# Patient Record
Sex: FEMALE | Race: WHITE | Employment: FULL TIME | URBAN - METROPOLITAN AREA
[De-identification: names, ages, dates, MRNs, and addresses within clinical notes are randomized per-mention and may not be internally consistent; named-entity substitution may affect disease eponyms.]

---

## 2017-11-10 ENCOUNTER — HOSPITAL ENCOUNTER (EMERGENCY)
Age: 57
Discharge: HOME | End: 2017-11-10
Attending: EMERGENCY MEDICINE
Payer: COMMERCIAL

## 2017-11-10 ENCOUNTER — APPOINTMENT (OUTPATIENT)
Dept: GENERAL RADIOLOGY | Age: 57
End: 2017-11-10
Payer: COMMERCIAL

## 2017-11-10 VITALS
TEMPERATURE: 98.5 F | RESPIRATION RATE: 16 BRPM | WEIGHT: 125 LBS | SYSTOLIC BLOOD PRESSURE: 134 MMHG | HEIGHT: 65 IN | OXYGEN SATURATION: 100 % | BODY MASS INDEX: 20.83 KG/M2 | DIASTOLIC BLOOD PRESSURE: 61 MMHG | HEART RATE: 75 BPM

## 2017-11-10 DIAGNOSIS — S99.911A INJURY OF RIGHT ANKLE, INITIAL ENCOUNTER: Primary | ICD-10-CM

## 2017-11-10 PROCEDURE — 29515 APPLICATION SHORT LEG SPLINT: CPT

## 2017-11-10 PROCEDURE — 99283 EMERGENCY DEPT VISIT LOW MDM: CPT

## 2017-11-10 PROCEDURE — 73630 X-RAY EXAM OF FOOT: CPT

## 2017-11-10 PROCEDURE — 73610 X-RAY EXAM OF ANKLE: CPT

## 2017-11-10 ASSESSMENT — ENCOUNTER SYMPTOMS
TROUBLE SWALLOWING: 0
NAUSEA: 0
VOMITING: 0
SHORTNESS OF BREATH: 0
COUGH: 0

## 2017-11-10 ASSESSMENT — PAIN DESCRIPTION - LOCATION: LOCATION: ANKLE

## 2017-11-10 ASSESSMENT — PAIN DESCRIPTION - PAIN TYPE: TYPE: ACUTE PAIN

## 2017-11-10 ASSESSMENT — PAIN SCALES - GENERAL: PAINLEVEL_OUTOF10: 5

## 2017-11-10 ASSESSMENT — PAIN DESCRIPTION - ORIENTATION: ORIENTATION: RIGHT

## 2017-11-10 NOTE — ED PROVIDER NOTES
[address]  eMERGENCY dEPARTMENT eNCOUnter   Independent Attestation     Pt Name: Mirna Zhou  MRN: [id number]  [place] 1960  Date of evaluation: 11/10/17       Mirna Zhou is a 59 y.o. female who presents with No chief complaint on file. Vitals:   Vitals:    11/10/17 1254   BP: 134/61   Pulse: 75   Resp: 16   Temp: 98.5 °F (36.9 °C)   SpO2: 100%   Weight: 125 lb (56.7 kg)   Height: 5' 5\" (1.651 m)       Impression: No diagnosis found. I was personally available for consultation in the Emergency Department. I have reviewed the chart and agree with the documentation as recorded by the [facility] AND [facility], including the assessment, treatment plan and disposition.     Xavier Anna DO  Attending Emergency  Physician                  Xavier Anna DO  11/10/17 [id number]

## 2017-11-10 NOTE — ED PROVIDER NOTES
[address]  eMERGENCY dEPARTMENT eNCOUnter      Pt Name: Marion Renee  MRN: [id number]  [place] 1960  Date of evaluation: 11/10/2017  Provider: Gregory Dillon [id number]       Chief Complaint   Patient presents with    Ankle Pain         HISTORY OF PRESENT ILLNESS  (Location/Symptom, Timing/Onset, Context/Setting, Quality, Duration, Modifying Factors, Severity.)   Marion Renee is a 59 y.o. female who presents to the emergency department for evaluation of  orthopedic pain:    Location/Symptom:  right ankle  Timing/Onset:  today  Context/Setting:  Patient presents to ED with complaints of right ankle injury. States she tripped and rolled her right ankle. Reports pain in right lateral ankle and foot. States she is able to ambulate but it causes discomfort. No numbness or tingling. No significant swelling. No other injuries. Quality:   Achy, sharp  Duration:   constant  Modifying Factors: Worse with movement, better with rest  Severity:   Mild-moderate      Nursing Notes were reviewed and I agree. REVIEW OF SYSTEMS    (2-9 systems for level 4, 10 or more for level 5)     Review of Systems   Constitutional: Negative for chills and fever. HENT: Negative for trouble swallowing. Respiratory: Negative for cough and shortness of breath. Cardiovascular: Negative for chest pain and palpitations. Gastrointestinal: Negative for nausea and vomiting. Musculoskeletal:        Right ankle injury     Except as noted above the remainder of the review of systems was reviewed and negative.        PAST MEDICAL HISTORY         Diagnosis Date    Abdominal pain     from gall bladder    Bronchiectasis (HCC)     Bunion of left foot     Chest pain     Cholelithiasis     Conjunctivitis of both eyes     Contact dermatitis due to poison ivy     Cough     Cough     Earache symptoms     Eustachian tube disorder     GERD (gastroesophageal reflux disease)     Hearing loss     distress. HENT:   Head: Normocephalic and atraumatic. Right Ear: External ear normal.   Left Ear: External ear normal.   Nose: Nose normal.   Eyes: Right eye exhibits no discharge. Left eye exhibits no discharge. No scleral icterus. Neck: Normal range of motion. No tracheal deviation present. Pulmonary/Chest: Effort normal. No stridor. No respiratory distress. Musculoskeletal: Normal range of motion. She exhibits no edema. Right ankle: She exhibits no swelling, no ecchymosis and no deformity. Tenderness. Lateral malleolus tenderness found. No medial malleolus, no head of 5th metatarsal and no proximal fibula tenderness found. Achilles tendon normal.   Tenderness to palpation of right lateral ankle/ foot. no significant swelling. No ecchymosis. No erythema. Normal dorsiflexion and plantar flexion. DP and PT palpable +2. Sensation intact. Cap refill less than 2 seconds. Able to bear weight but painful. Neurological: She is alert and oriented to person, place, and time. Coordination normal.   Skin: Skin is warm and dry. She is not diaphoretic. Psychiatric: She has a normal mood and affect. Her behavior is normal.       DIAGNOSTIC RESULTS     RADIOLOGY:   [] Visualized by me  And No att. providers found   Xr Ankle Right (min 3 Views)    Result Date: 11/10/2017  EXAMINATION: 3 VIEWS OF THE RIGHT ANKLE; 3 VIEWS OF THE RIGHT FOOT 11/10/2017 1:05 pm COMPARISON: None. HISTORY: ORDERING SYSTEM PROVIDED HISTORY: injury TECHNOLOGIST PROVIDED HISTORY: Reason for exam:->injury Ordering Physician Provided Reason for Exam: PT CO lateral ankle and foot pain after falling earlier this afternoon. Acuity: Acute Type of Exam: Initial FINDINGS: Right ankle: Three views of the right ankle demonstrate normal mineralization and alignment without dislocation or localized soft tissue swelling. .  Ankle mortise is uniform. Talar dome and talar walls are intact.   Small calcification is noted along the dorsum of the talus with adjacent haziness, nonacute in appearance, likely sequelae of nonacute injury. Area is not associated with significant soft tissue swelling. Right foot:  Mineralization and alignment are satisfactory. The patient has moderate arthritic changes in the interphalangeal joints of the 3rd digit. Mild degenerative changes involve some of the interphalangeal joints and the tarsal metatarsal junction. On the dorsum of the talus, nonacute appearing calcification is noted likely rib related to sequelae of old injury. Right ankle:  Irregularity on the dorsum of the distal talus is noted, without significant associated soft tissue swelling, likely nonacute. Ankle is otherwise unremarkable. Right foot:  Small calcification along the dorsum of the talus without significant adjacent soft tissue swelling, likely nonacute. Arthritic changes are present. RECOMMENDATION: Clinical correlation is advised with attention to the dorsum of the talus. Xr Foot Right (min 3 Views)    Result Date: 11/10/2017  EXAMINATION: 3 VIEWS OF THE RIGHT ANKLE; 3 VIEWS OF THE RIGHT FOOT 11/10/2017 1:05 pm COMPARISON: None. HISTORY: ORDERING SYSTEM PROVIDED HISTORY: injury TECHNOLOGIST PROVIDED HISTORY: Reason for exam:->injury Ordering Physician Provided Reason for Exam: PT CO lateral ankle and foot pain after falling earlier this afternoon. Acuity: Acute Type of Exam: Initial FINDINGS: Right ankle: Three views of the right ankle demonstrate normal mineralization and alignment without dislocation or localized soft tissue swelling. .  Ankle mortise is uniform. Talar dome and talar walls are intact. Small calcification is noted along the dorsum of the talus with adjacent haziness, nonacute in appearance, likely sequelae of nonacute injury. Area is not associated with significant soft tissue swelling. Right foot:  Mineralization and alignment are satisfactory.   The patient has moderate arthritic changes in the interphalangeal

## 2017-11-27 ENCOUNTER — HOSPITAL ENCOUNTER (OUTPATIENT)
Age: 57
Discharge: HOME | End: 2017-11-27
Payer: COMMERCIAL

## 2017-11-27 ENCOUNTER — HOSPITAL ENCOUNTER (OUTPATIENT)
Dept: ULTRASOUND IMAGING | Age: 57
Discharge: HOME | End: 2017-11-27
Payer: COMMERCIAL

## 2017-11-27 DIAGNOSIS — E04.2 AUTOSOMAL DOMINANT MULTINODULAR GOITER ASSOCIATED WITH MUTATION IN DICER1 GENE: ICD-10-CM

## 2017-11-27 DIAGNOSIS — E04.2 NONTOXIC MULTINODULAR GOITER: ICD-10-CM

## 2017-11-27 LAB
THYROXINE, FREE: 1.5 NG/DL (ref 0.93–1.7)
TSH SERPL DL<=0.05 MIU/L-ACNC: 1.27 MIU/L (ref 0.3–5)

## 2017-11-27 PROCEDURE — 84439 ASSAY OF FREE THYROXINE: CPT

## 2017-11-27 PROCEDURE — 76536 US EXAM OF HEAD AND NECK: CPT

## 2017-11-27 PROCEDURE — 36415 COLL VENOUS BLD VENIPUNCTURE: CPT

## 2017-11-27 PROCEDURE — 84443 ASSAY THYROID STIM HORMONE: CPT

## 2018-06-11 ENCOUNTER — HOSPITAL ENCOUNTER (OUTPATIENT)
Dept: GENERAL RADIOLOGY | Age: 58
Discharge: HOME | End: 2018-06-13
Payer: COMMERCIAL

## 2018-06-11 ENCOUNTER — HOSPITAL ENCOUNTER (OUTPATIENT)
Age: 58
Discharge: HOME | End: 2018-06-11
Payer: COMMERCIAL

## 2018-06-11 ENCOUNTER — HOSPITAL ENCOUNTER (OUTPATIENT)
Age: 58
Discharge: HOME | End: 2018-06-13
Payer: COMMERCIAL

## 2018-06-11 DIAGNOSIS — R52 PAIN: ICD-10-CM

## 2018-06-11 DIAGNOSIS — R06.02 SOB (SHORTNESS OF BREATH): ICD-10-CM

## 2018-06-11 LAB
EKG ATRIAL RATE: 72 BPM
EKG P AXIS: 78 DEGREES
EKG P-R INTERVAL: 150 MS
EKG Q-T INTERVAL: 400 MS
EKG QRS DURATION: 92 MS
EKG QTC CALCULATION (BAZETT): 438 MS
EKG R AXIS: 71 DEGREES
EKG T AXIS: 66 DEGREES
EKG VENTRICULAR RATE: 72 BPM

## 2018-06-11 PROCEDURE — 73030 X-RAY EXAM OF SHOULDER: CPT

## 2018-06-11 PROCEDURE — 93005 ELECTROCARDIOGRAM TRACING: CPT

## 2018-06-11 PROCEDURE — 71046 X-RAY EXAM CHEST 2 VIEWS: CPT

## 2018-08-09 ENCOUNTER — APPOINTMENT (OUTPATIENT)
Dept: GENERAL RADIOLOGY | Age: 58
End: 2018-08-09
Payer: COMMERCIAL

## 2018-08-09 ENCOUNTER — HOSPITAL ENCOUNTER (EMERGENCY)
Age: 58
Discharge: HOME | End: 2018-08-09
Attending: EMERGENCY MEDICINE
Payer: COMMERCIAL

## 2018-08-09 VITALS
SYSTOLIC BLOOD PRESSURE: 146 MMHG | HEART RATE: 69 BPM | BODY MASS INDEX: 20.83 KG/M2 | HEIGHT: 65 IN | TEMPERATURE: 98.5 F | OXYGEN SATURATION: 100 % | RESPIRATION RATE: 15 BRPM | WEIGHT: 125 LBS | DIASTOLIC BLOOD PRESSURE: 85 MMHG

## 2018-08-09 DIAGNOSIS — S82.892A CLOSED FRACTURE OF LEFT ANKLE, INITIAL ENCOUNTER: Primary | ICD-10-CM

## 2018-08-09 PROCEDURE — 73610 X-RAY EXAM OF ANKLE: CPT

## 2018-08-09 PROCEDURE — 29515 APPLICATION SHORT LEG SPLINT: CPT

## 2018-08-09 PROCEDURE — 99283 EMERGENCY DEPT VISIT LOW MDM: CPT

## 2018-08-09 PROCEDURE — 6370000000 HC RX 637 (ALT 250 FOR IP): Performed by: PHYSICIAN ASSISTANT

## 2018-08-09 RX ORDER — IBUPROFEN 600 MG/1
600 TABLET ORAL ONCE
Status: COMPLETED | OUTPATIENT
Start: 2018-08-09 | End: 2018-08-09

## 2018-08-09 RX ADMIN — IBUPROFEN 600 MG: 600 TABLET ORAL at 09:06

## 2018-08-09 ASSESSMENT — PAIN DESCRIPTION - FREQUENCY: FREQUENCY: CONTINUOUS

## 2018-08-09 ASSESSMENT — PAIN DESCRIPTION - ORIENTATION: ORIENTATION: LEFT

## 2018-08-09 ASSESSMENT — PAIN DESCRIPTION - DESCRIPTORS: DESCRIPTORS: ACHING

## 2018-08-09 ASSESSMENT — PAIN SCALES - GENERAL
PAINLEVEL_OUTOF10: 8
PAINLEVEL_OUTOF10: 5

## 2018-08-09 ASSESSMENT — PAIN DESCRIPTION - LOCATION: LOCATION: ANKLE

## 2018-08-09 ASSESSMENT — PAIN DESCRIPTION - ONSET: ONSET: SUDDEN

## 2018-08-09 ASSESSMENT — PAIN DESCRIPTION - PAIN TYPE: TYPE: ACUTE PAIN

## 2018-08-09 NOTE — ED PROVIDER NOTES
[address]  eMERGENCY dEPARTMENT eNCOUnter   Independent Attestation     Pt Name: Joesph Holt  MRN: [id number]  [place] 1960  Date of evaluation: 8/9/18       Joesph Holt is a 59 y.o. female who presents with Ankle Pain (left)        I was personally available for consultation in the Emergency Department. Dorita Zaidi MD, [address], [facility]  Attending Emergency Physician                    Danica Jackson MD  08/09/18 [id number]

## 2018-08-09 NOTE — ED PROVIDER NOTES
[address]  eMERGENCY dEPARTMENT eNCOUnter      Pt Name: Asia Pagan  MRN: [id number]  [place] 1960  Date of evaluation: 8/9/18      CHIEF COMPLAINT       Chief Complaint   Patient presents with    Ankle Pain     left         HISTORY OF PRESENT ILLNESS    Asia Pagan is a 59 y.o. female who presents complaining of left ankle pain  The history is provided by the patient. Ankle Problem   Location:  Ankle  Time since incident:  1 hour  Injury: yes    Mechanism of injury comment:  Stepped down and twisted left ankle, approx 30 min pta, felt pop  Ankle location:  L ankle  Pain details:     Severity:  Moderate    Onset quality:  Sudden  Chronicity:  New  Dislocation: no    Foreign body present:  No foreign bodies  Tetanus status:  Unknown  Prior injury to area:  Unable to specify  Associated symptoms: swelling        REVIEW OF SYSTEMS       Review of Systems   Musculoskeletal: Positive for arthralgias (left lateral ankle pain). All other systems reviewed and are negative.       PAST MEDICAL HISTORY     Past Medical History:   Diagnosis Date    Abdominal pain     from gall bladder    Bronchiectasis (HCC)     Bunion of left foot     Chest pain     Cholelithiasis     Conjunctivitis of both eyes     Contact dermatitis due to poison ivy     Cough     Cough     Earache symptoms     Eustachian tube disorder     GERD (gastroesophageal reflux disease)     Hearing loss     Hypothyroidism     Malaise     Osteoarthritis     Otitis media     Pain in shoulder     Palpitations     Pharyngitis     Premature ventricular contractions     Pulmonary embolism (HCC)     Raynaud's phenomenon     Rhinitis     Shoulder arthritis     Shoulder pain     Soft tissue injury of foot     Sore throat     Sprain of ankle     Tendinitis     Tinea corporis     URI (upper respiratory infection)     Varicose veins     Vertigo     Wears glasses     Xerosis cutis        SURGICAL HISTORY       Past Surgical History:   Procedure Laterality Date    CHOLECYSTECTOMY  06/21/2016    robotic assisted laparoscopic    COLONOSCOPY      CYST REMOVAL      left wrist and left upper arm    ENDOMETRIAL ABLATION      TONSILLECTOMY      TUBAL LIGATION         CURRENT MEDICATIONS       Discharge Medication List as of 8/9/2018 10:04 AM      CONTINUE these medications which have NOT CHANGED    Details   levothyroxine (LEVOTHROID) 50 MCG tablet Take 1 tablet by mouth daily, Disp-30 tablet, R-3      calcium carbonate (OSCAL) 500 MG TABS tablet Take 500 mg by mouth daily             ALLERGIES     has No Known Allergies. FAMILY HISTORY     indicated that her mother is alive. She indicated that her father is alive. SOCIAL HISTORY      reports that she has never smoked. She has never used smokeless tobacco. She reports that she drinks alcohol. She reports that she does not use drugs. PHYSICAL EXAM     INITIAL VITALS: BP (!) 146/85   Pulse 69   Temp 98.5 °F (36.9 °C) (Oral)   Resp 15   Ht 5' 5\" (1.651 m)   Wt 125 lb (56.7 kg)   SpO2 100%   BMI 20.80 kg/m²      Physical Exam   Constitutional: She is oriented to person, place, and time. She appears well-developed and well-nourished. HENT:   Head: Normocephalic and atraumatic. Cardiovascular: Normal rate, regular rhythm, normal heart sounds and intact distal pulses. Pulmonary/Chest: Effort normal and breath sounds normal.   Musculoskeletal: She exhibits tenderness (left lateral ankle, no deformity, periph pulse palp.tender over the lateral malleolus). She exhibits no edema or deformity. Neurological: She is alert and oriented to person, place, and time. Skin: Skin is warm and dry. Nursing note and vitals reviewed. MEDICAL DECISION MAKING:     Left lateral ankle pain tenderness over the lateral malleolus status post stepping awkwardly off a step. X-ray shows avulsion tip fracture of the lateral malleolus per radiology.   Placed in posterior [facility] splint use crutches ice elevate Tylenol Motrin for discomfort. Follow-up with your orthopedic doctor for recheck and further evaluation and treatment. Return for any worsening symptoms any other concerns. DIAGNOSTIC RESULTS     EKG: All EKG's are interpreted by the Emergency Department Physician who either signs or Co-signs this chart in the absence of a cardiologist.        RADIOLOGY:All plain film, CT, MRI, and formal ultrasound images (except ED bedside ultrasound) are read by the radiologist and the images and interpretations are directly viewed by the emergency physician. Avulsion tip fracture of the lateral malleolus.  Correlate with point   tenderness exam.       Avulsion fracture distal fibula per radiology. LABS: All lab results were reviewed by myself, and all abnormals are listed below. Labs Reviewed - No data to display      EMERGENCY DEPARTMENT COURSE:   Vitals:    Vitals:    08/09/18 0852   BP: (!) 146/85   Pulse: 69   Resp: 15   Temp: 98.5 °F (36.9 °C)   TempSrc: Oral   SpO2: 100%   Weight: 125 lb (56.7 kg)   Height: 5' 5\" (1.651 m)       The patient was given the following medications while in the emergency department:  Orders Placed This Encounter   Medications    ibuprofen (ADVIL;MOTRIN) tablet 600 mg       -------------------------      CRITICAL CARE:     CONSULTS:  None    PROCEDURES:  Procedures    FINAL IMPRESSION      1. Closed fracture of left ankle, initial encounter          DISPOSITION/PLAN   DISPOSITION Decision To Discharge 08/09/2018 10:03:39 AM      PATIENT REFERRED TO:  Aries Suresh MD  [address]   [place]  [facility] [address]    Schedule an appointment as soon as possible for a visit in 1 day      [facility] ED  [facility] [address] [facility] [address]  [phone]    If symptoms worsen      DISCHARGE MEDICATIONS:  Discharge Medication List as of 8/9/2018 10:04 AM          (Please note that portions of this note were completed with a voice

## 2019-05-10 ENCOUNTER — HOSPITAL ENCOUNTER (OUTPATIENT)
Dept: SLEEP CENTER | Age: 59
Discharge: HOME | End: 2019-05-12
Payer: COMMERCIAL

## 2019-05-10 DIAGNOSIS — G47.33 OSA (OBSTRUCTIVE SLEEP APNEA): Primary | ICD-10-CM

## 2019-05-10 PROCEDURE — 95810 POLYSOM 6/> YRS 4/> PARAM: CPT

## 2019-05-11 VITALS
WEIGHT: 132 LBS | HEART RATE: 67 BPM | HEIGHT: 64 IN | BODY MASS INDEX: 22.53 KG/M2 | OXYGEN SATURATION: 96 % | RESPIRATION RATE: 12 BRPM

## 2019-05-11 ASSESSMENT — SLEEP AND FATIGUE QUESTIONNAIRES
HOW LIKELY ARE YOU TO NOD OFF OR FALL ASLEEP WHILE SITTING INACTIVE IN A PUBLIC PLACE: 0
HOW LIKELY ARE YOU TO NOD OFF OR FALL ASLEEP WHILE WATCHING TV: 1
ESS TOTAL SCORE: 6
HOW LIKELY ARE YOU TO NOD OFF OR FALL ASLEEP WHILE SITTING AND TALKING TO SOMEONE: 0
HOW LIKELY ARE YOU TO NOD OFF OR FALL ASLEEP WHILE SITTING AND READING: 2
HOW LIKELY ARE YOU TO NOD OFF OR FALL ASLEEP WHEN YOU ARE A PASSENGER IN A CAR FOR AN HOUR WITHOUT A BREAK: 2
HOW LIKELY ARE YOU TO NOD OFF OR FALL ASLEEP WHILE LYING DOWN TO REST IN THE AFTERNOON WHEN CIRCUMSTANCES PERMIT: 1
HOW LIKELY ARE YOU TO NOD OFF OR FALL ASLEEP IN A CAR, WHILE STOPPED FOR A FEW MINUTES IN TRAFFIC: 0
HOW LIKELY ARE YOU TO NOD OFF OR FALL ASLEEP WHILE SITTING QUIETLY AFTER LUNCH WITHOUT ALCOHOL: 0

## 2019-06-04 ENCOUNTER — HOSPITAL ENCOUNTER (OUTPATIENT)
Dept: PULMONOLOGY | Age: 59
Discharge: HOME | End: 2019-06-04
Payer: COMMERCIAL

## 2019-06-04 LAB — STATUS: NORMAL

## 2019-06-04 PROCEDURE — 6360000002 HC RX W HCPCS: Performed by: INTERNAL MEDICINE

## 2019-06-04 PROCEDURE — 94060 EVALUATION OF WHEEZING: CPT

## 2019-06-04 PROCEDURE — 94664 DEMO&/EVAL PT USE INHALER: CPT

## 2019-06-04 PROCEDURE — 94761 N-INVAS EAR/PLS OXIMETRY MLT: CPT

## 2019-06-04 PROCEDURE — 94070 EVALUATION OF WHEEZING: CPT

## 2019-06-04 RX ORDER — ALBUTEROL SULFATE 2.5 MG/3ML
2.5 SOLUTION RESPIRATORY (INHALATION) EVERY 10 MIN PRN
Status: DISCONTINUED | OUTPATIENT
Start: 2019-06-04 | End: 2019-06-04

## 2019-06-04 RX ADMIN — METHACHOLINE CHLORIDE 100 MG: 100 POWDER, FOR SOLUTION RESPIRATORY (INHALATION) at 09:54

## 2019-06-04 RX ADMIN — ALBUTEROL SULFATE 2.5 MG: 2.5 SOLUTION RESPIRATORY (INHALATION) at 10:17

## 2019-06-05 NOTE — PROCEDURES
[address]                 [address] [place], [address]                               PULMONARY FUNCTION    PATIENT NAME: Brian Caceres                   :        1960  MED REC NO:   [id number]                              ROOM:  ACCOUNT NO:   [de-identified]                           ADMIT DATE: 2019  PROVIDER:     Rodriguez Davila    DATE OF PROCEDURE:  2019    RESULTS:  The FVC and FEV1 pre-spirometry was normal.  The FEV1/FVC  ratio was normal.  Methacholine was given per protocol up to level 5. At that level, there was no evidence of any decrease in the FEV1. IMPRESSION:  This is a negative methacholine challenge test effectively  excluding obstructive lung disease as a cause of the patient's dyspnea.         Shakila Kuhn    D: 2019 14:42:05       T: 2019 2:20:21     [id number]  Job#: [id number]     Doc#: [id number]    CC:

## 2019-12-04 ENCOUNTER — HOSPITAL ENCOUNTER (OUTPATIENT)
Age: 59
Discharge: HOME | End: 2019-12-04
Payer: COMMERCIAL

## 2019-12-04 LAB
THYROXINE, FREE: 1.6 NG/DL (ref 0.93–1.7)
TSH SERPL DL<=0.05 MIU/L-ACNC: 2.19 MIU/L (ref 0.3–5)

## 2019-12-04 PROCEDURE — 36415 COLL VENOUS BLD VENIPUNCTURE: CPT

## 2019-12-04 PROCEDURE — 84439 ASSAY OF FREE THYROXINE: CPT

## 2019-12-04 PROCEDURE — 84443 ASSAY THYROID STIM HORMONE: CPT
